# Patient Record
Sex: MALE
[De-identification: names, ages, dates, MRNs, and addresses within clinical notes are randomized per-mention and may not be internally consistent; named-entity substitution may affect disease eponyms.]

---

## 2018-04-22 ENCOUNTER — HOSPITAL ENCOUNTER (INPATIENT)
Dept: HOSPITAL 31 - C.ER | Age: 53
LOS: 5 days | Discharge: HOME | DRG: 430 | End: 2018-04-27
Attending: PSYCHIATRY & NEUROLOGY | Admitting: PSYCHIATRY & NEUROLOGY
Payer: MEDICAID

## 2018-04-22 VITALS — OXYGEN SATURATION: 97 %

## 2018-04-22 DIAGNOSIS — E78.00: ICD-10-CM

## 2018-04-22 DIAGNOSIS — Z59.0: ICD-10-CM

## 2018-04-22 DIAGNOSIS — F41.9: ICD-10-CM

## 2018-04-22 DIAGNOSIS — R45.851: ICD-10-CM
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
## 2018-04-22 DIAGNOSIS — I10: ICD-10-CM

## 2018-04-22 DIAGNOSIS — G47.00: ICD-10-CM

## 2018-04-22 DIAGNOSIS — F33.2: Primary | ICD-10-CM

## 2018-04-22 DIAGNOSIS — F10.10: ICD-10-CM

## 2018-04-22 DIAGNOSIS — F34.1: ICD-10-CM

## 2018-04-22 LAB
ALBUMIN SERPL-MCNC: 3.7 [, G/DL] (ref 3.5–5)
ALBUMIN/GLOB SERPL: 1.1 [,] (ref 1–2.1)
ALT SERPL-CCNC: 45 [, U/L] (ref 21–72)
AST SERPL-CCNC: 42 [, U/L] (ref 17–59)
BUN SERPL-MCNC: 15 [, MG/DL] (ref 9–20)
CALCIUM SERPL-MCNC: 9.2 [, MG/DL] (ref 8.6–10.4)
GFR NON-AFRICAN AMERICAN: > 60 [,]

## 2018-04-22 PROCEDURE — GZHZZZZ GROUP PSYCHOTHERAPY: ICD-10-PCS | Performed by: PSYCHIATRY & NEUROLOGY

## 2018-04-22 RX ADMIN — Medication SCH TAB: at 09:35

## 2018-04-22 SDOH — ECONOMIC STABILITY - HOUSING INSECURITY: HOMELESSNESS: Z59.0

## 2018-04-22 NOTE — PCM.PSYCH
Initial Psychiatric Evaluation





- Initial Psychiatric Evaluation


Type of Admission: Voluntary


Legal Status: Capacity


Chief Complaint (in patient's own words): 





"Depressed"


History of Present Illness and Precipitating Events: 





The patient is seen, chart reviewed and case discussed.


This is a 52-year-old white male, single with one son who is 20 years old. The 

patient lives alone and he is homeless. He left [facility] in [place] 

less than a week ago because he didn't like to program's focus on Religious. 

He says he is Latter-day but he didn't want to be in a program that "pushes it."


He reports depressive symptoms, had some suicidal ideation but no plan, anxiety 

especially panic attacks, but no paranoia or hallucinations elicited.


He has a long history of alcohol use and since he was discharged from [facility] 

[facility] he drank on and off. He denies drug use. He denies w sxs.





Past psych history: 4 or 5 admissions to psychiatry with depression and 

suicidal ideation, had one attempt in 2017 by OD and





Family psych history: Denies





Medical history: High cholesterol, hypertension, peripheral neuropathy


Current Medications: 





Active Medications











Generic Name Dose Route Start Last Admin





  Trade Name Freq  PRN Reason Stop Dose Admin


 


Amlodipine Besylate  5 mg  04/22/18 10:00  04/22/18 09:36





  Norvasc  PO   5 mg





  DAILY ADALID   Administration


 


Escitalopram Oxalate  10 mg  04/22/18 10:00  





  Lexapro  PO   





  DAILY ADALID   


 


Folic Acid  1 mg  04/22/18 10:00  04/22/18 09:35





  Folic Acid  PO   1 mg





  DAILY ADALID   Administration


 


Gabapentin  300 mg  04/22/18 10:00  





  Neurontin  PO   





  TID ADALID   


 


Hydroxyzine HCl  50 mg  04/22/18 08:52  





  Atarax  PO   





  Q6H PRN   





  Anxiety   


 


Ibuprofen  600 mg  04/22/18 08:52  





  Motrin Tab  PO   





  Q6H PRN   





  Pain, moderate (4-7)   


 


Losartan Potassium  100 mg  04/22/18 10:00  





  Cozaar  PO   





  DAILY [facility]   


 


Multivitamins  1 tab  04/22/18 10:00  04/22/18 09:35





  Hexavitamin  PO   1 tab





  DAILY ADALID   Administration


 


Quetiapine Fumarate  200 mg  04/22/18 22:00  





  Seroquel  PO   





  HS ADALID   


 


Rosuvastatin Calcium  10 mg  04/22/18 22:00  





  Crestor  PO   





  HS ADALID   


 


Thiamine HCl  100 mg  04/22/18 10:00  04/22/18 09:35





  Vitamin B1 Tab  PO   100 mg





  DAILY ADALID   Administration














Past Psychiatric History





- Past Psychiatric History


Previous Treatment History: Inpatient


Pertinent Medical Hx (Current Medical&Sleep Prob, Allergies): 





 Allergies











Allergy/AdvReac Type Severity Reaction Status Date / Time


 


No Known Allergies Allergy   Verified 04/22/18 02:40








 





Atorvastatin [Lipitor] 20 mg PO DAILY 04/22/18 


Escitalopram [Lexapro] 10 mg PO DAILY 04/22/18 


Gabapentin [Neurontin] 400 mg PO TID 04/22/18 


Losartan [Cozaar] 100 mg PO DAILY 04/22/18 


Mirtazapine [Remeron] 15 mg PO DAILY 04/22/18 


QUEtiapine [SEROquel] 200 mg PO DAILY 04/22/18 


amLODIPine [Norvasc] 5 mg PO DAILY 04/22/18 











Review of Systems





- Psychiatric


Psychiatric: Abnormal Sleep Pattern, Anhedonia, Anxiety, Change in Appetite, 

Depression, Difficulty Concentrating, Irritability.  absent: Hallucinations, 

Homicidal Ideation, Paranoia, Suicidal Ideation





Mental Status Examination





- Personal Presentation


Personal Presentation: Looks older than stated age





- Affect


Affect: Constricted





- Motor Activity


Motor Activity: Calm





- Reliability in Providing Information


Reliability in Providing Information: Good





- Speech


Speech: Organized





- Mood


Mood: Depressed, Anxious





- Formal Thought Process


Formal Thought Process: No Impairment





- Cognitive Functions


Orientation: Person, Place, Situation, Time


Sensorium: Alert


Attention/Concentration: Attentive


Estimate of Intelligence: Average


Judgement: Intact, as evidence by: Insight regarding need for hospitalization


Memory: Recent intact, as evidence by: Ability to recall events of the day, 

Remote intact, as evidenced by: Abilit to recall sig. life events





- Risk


Risk: Diminished functioning





- Strength & Assets Inventory


Strength & Assets Inventory: Cooperative





- Limitations


Limitations: Living alone, Other





DSM 5 DX





- DSM 5


DSM 5 Diagnosis: 





Major depression - recurrent, severe, w/o psychosis


Dysthymic d/o


r/o SAMPSON


Alcohol use d/o - severe





- Recommended/Plan of Treatment


Treatment Recommendations and Plan of Treatment: 





Lexapro and Seroquel for depression, insomnia, mood swings, irritability


Gabapentin for anxiety


Prn medications


All risks, benefits and alternatives of medications, including no medications, 

discussed and the patient understood and agreed.


Attend groups and activities


Individual therapy


Psychoeducation and support


Encourage compliance with meds and after care


Refer to outpatient program 


Teach healthy lifestyle methods, i.e. diet, exercise, meditation


Smoking cessation





32 min


Projected ELOS: 5-6 days


Prognosis: good w treatment


Discharge Plan and Discharge Criteria: 





No severe sx





- Smoking Cessation


Smoking Cessation Initiated: Yes

## 2018-04-22 NOTE — C.PDOC
History Of Present Illness


52 year old male presents to the ED via EMS for admittance to psychiatric floor 

here at the ED. Patient with a past medical history of bipolar disorder was 

referred from [facility] for admittance to facility by Dr. Donovan for 

medical workup. Patient is presented to us prior to heading upstairs. 


Chief Complaint (Nursing): Psychiatric Evaluation


History Per: Patient


History/Exam Limitations: no limitations


Onset/Duration Of Symptoms: Hrs


Current Symptoms Are (Timing): Still Present


Suicide/Self Injury Attempted (Context): None





Past Medical History


Reviewed: Historical Data, Nursing Documentation, Vital Signs


Vital Signs: 


 Last Vital Signs











Temp  97.8 F   04/22/18 02:31


 


Pulse  60   04/22/18 02:31


 


Resp  19   04/22/18 02:31


 


BP  131/81   04/22/18 02:31


 


Pulse Ox  96   04/22/18 03:06














- Medical History


PMH: Anxiety, Bipolar Disorder, Depression, HTN, Hypercholesterolemia, Chronic 

Kidney Disease


Surgical History: No Surg Hx


Family History: States: Unknown Family Hx





- Social History


Hx Alcohol Use: Yes ([place] [place] DAILY)


Hx Substance Use: No





- Immunization History


Hx Influenza Vaccination: Yes





Review Of Systems


Except As Marked, All Systems Reviewed And Found Negative.


Neurological: Positive for: Other (bipolar disorder)





Physical Exam





- Physical Exam


Appears: Non-toxic, No Acute Distress


Neurological/Psych: Oriented x3


Gait: Steady


Other Neurological Findings: No Other (no suicidal or homicidal ideation at 

this time. )





ED Course And Treatment


O2 Sat by Pulse Oximetry: 96 (RA)


Pulse Ox Interpretation: Normal





Medical Decision Making


Medical Decision Making: 


Time:0305


-- Physical exam unremarkable to stigmata of drug use. Lab work review patients 

and they are clear to psych floor for bipolar disorder. 





Disposition





- Disposition


Forms:  [facility] (English)





- Scribe Statement


The provider has reviewed the documentation as recorded by the Desi Lopez 





Provider Attestation: 








All medical record entries made by the Scribe were at my direction and 

personally dictated by me. I have reviewed the chart and agree that the record 

accurately reflects my personal performance of the history, physical exam, 

medical decision making, and the department course for this patient. I have 

also personally directed, reviewed, and agree with the discharge instructions 

and disposition.

## 2018-04-22 NOTE — PCM.BM
<Amarjit Hernandez - Last Filed: 04/22/18 05:04>





Treatment Plan Problems





- Problems identified on initial assessmt


  ** Depression


Date Initiated: 04/22/18


Time Initiated: 04:30


Assessment reference: NA


Status: Active





  ** Alcohol Abuse


Date Initiated: 04/22/18


Time Initiated: 04:30


Assessment reference: NA


Status: Active





Treatment assets and liabiliti


Patient Assests: ADL independent


Patient Liabilities: poor support system, relationship conflicts, substance 

abuse (ETOH)





- Milieu Protocol


Maintain good personal hygiene: daily Encourage regular showers, daily Remind 

patient to perform daily oral care, every shift Assist patient to perform ADL's


Conduct patient checks and document Observation sheet: Q15 minutes


Maintain personal safety: every shift Educate patient to report safety concerns 

to staff, every shift Monitor environment for contraband/sharps


Medication safety: Monitor for expected outcome, potential side effects: every 

shift, Assess barriers to learning: every shift, Assess readiness for 

medication education: every shift





<Mason Donovan - Last Filed: 04/22/18 16:50>





- Diagnosis


(1) Major depressive disorder, recurrent episode, severe


Status: Acute   


Interventions: 





04/22/18 16:50


* Assess/adjust medications daily and /or as needed


* See patient on an individual basis 7x/week to assess symptoms of depression


* Monitor for side effects & effectiveness of medications


* 








(2) Alcohol use disorder, severe, dependence


Status: Acute   


Interventions: 





04/22/18 16:50


* Assess 7x/week regarding severity of withdrawal


* Educate regarding risks, benefits, side effects and alternatives of 

medications


* Use Motivational Interviewing for abstinence


* Use CBT for relapse prevention


* Medication management for withdrawal symptoms


* Encourage medication assisted treatment


* 








<Liya Dubois - Last Filed: 04/23/18 11:39>





Family Contact


Family involvement: Famliy/SO not involved





- Goals for Treatment


Patient goals for treatment: "I want to go to rehab."





Discharge/Continuing Care





- Education Needs


Education Needs: Patient Medication, Patient Coping Skills, Patient Placement 

options, Patient Community resources





- Discharge


Discharge Criteria: Tolerates medication w/o severe side effects, No longer 

exhibiting s/s of withdrawal, Reduction of target symptoms


Discharge to:: Substance Abuse Rehab





- Treatment Team Participation


Discussed with Family/SO: No


Was Patient/Family/SO present at Treatment Team Meeting: Yes

## 2018-04-23 RX ADMIN — Medication SCH TAB: at 09:41

## 2018-04-23 NOTE — PCM.PYCHPN
Psychiatric Progress Note





- Psychiatric Progress Note


Patient seen today, length of contact: 17 min


Patient Chief Complaint: 





"Still depressed"


Problems Identified/Issues Discussed: 





The pt is seen, chart reviewed, case discussed with staff.


Support given, CBT and MI used briefly


No new symptoms reported, improving slowly and needs more time


No SEs from medications, risks discussed.


After care discussed


Medication Change: Yes


Medical Record Reviewed: Yes





Mental Status Examination





- Cognitive Function


Orientation: Person, Place, Situation, Time


Memory: Intact


Attention: Poor


Concentration: Poor


Association: WNL


Fund of Knowledge: WNL





- Mood


Mood: Depressed, Anxious





- Affect


Affect: Constricted





- Speech


Speech: Appropriate





- Formal Thought Process


Formal Thought Process: No Impairment





- Suicidal Ideation


Suicidal Ideation: No





- Homicidal Ideation


Homicidal Ideation: No





Goal/Treatment Plan





- Goal/Treatment Plan


Need for Continued Stay: Discharge may exacerbated symptoms, Severe functional 

impairment


Progress Toward Problem(s) and Goals/Treatment Plan: 





Lexapro and Seroquel for depression, insomnia, mood swings, irritability


Gabapentin for anxiety


Prn medications


All risks, benefits and alternatives of medications, including no medications, 

discussed and the patient understood and agreed.


Attend groups and activities


Individual therapy


Psychoeducation and support


Encourage compliance with meds and after care


Refer to outpatient program 


Teach healthy lifestyle methods, i.e. diet, exercise, meditation


Smoking cessation

## 2018-04-24 VITALS — RESPIRATION RATE: 20 BRPM

## 2018-04-24 RX ADMIN — Medication SCH TAB: at 10:34

## 2018-04-24 NOTE — PCM.PYCHPN
Psychiatric Progress Note





- Psychiatric Progress Note


Patient seen today, length of contact: 16 min


Patient Chief Complaint: 





"not well"


Problems Identified/Issues Discussed: 





The pt is seen, chart reviewed, case discussed with staff.


The pt is compliant with medications and reports no side-effects.


Symptoms are improving but needs more time to stabilize. 


After care discussed, support and psychoeducation given.


Wants rehab but not [facility]


Medication Change: No


Medical Record Reviewed: Yes





Mental Status Examination





- Cognitive Function


Orientation: Person, Place, Situation, Time


Memory: Intact


Attention: Poor


Concentration: Poor


Association: WNL


Fund of Knowledge: WNL





- Mood


Mood: Depressed, Anxious





- Affect


Affect: Constricted





- Speech


Speech: Appropriate





- Formal Thought Process


Formal Thought Process: No Impairment





- Suicidal Ideation


Suicidal Ideation: No





- Homicidal Ideation


Homicidal Ideation: No





Goal/Treatment Plan





- Goal/Treatment Plan


Need for Continued Stay: Discharge may exacerbated symptoms, Severe functional 

impairment


Progress Toward Problem(s) and Goals/Treatment Plan: 





Lexapro and Seroquel for depression, insomnia, mood swings, irritability


Gabapentin for anxiety


Prn medications


All risks, benefits and alternatives of medications, including no medications, 

discussed and the patient understood and agreed.


Attend groups and activities


Individual therapy


Psychoeducation and support


Encourage compliance with meds and after care


Refer to outpatient program 


Teach healthy lifestyle methods, i.e. diet, exercise, meditation


Smoking cessation

## 2018-04-25 RX ADMIN — Medication SCH TAB: at 09:52

## 2018-04-25 NOTE — PCM.PYCHPN
Psychiatric Progress Note





- Psychiatric Progress Note


Patient seen today, length of contact: 18 min


Patient Chief Complaint: 





"I am feeling a little better, but am still depressed"


Problems Identified/Issues Discussed: 


The pt is seen, chart reviewed, case discussed with staff.


Support given, CBT and MI used briefly


No new symptoms reported, improving slowly and needs more time


No SEs from medications, risks discussed.


After care discussed - patient wants rehab, but not [facility], and is 

applying to programs


Medication Change: No


Medical Record Reviewed: Yes





Mental Status Examination





- Cognitive Function


Orientation: Person, Place, Situation, Time


Memory: Intact


Attention: Poor


Concentration: Poor


Association: WNL


Fund of Knowledge: WNL





- Mood


Mood: Depressed, Anxious





- Affect


Affect: Constricted





- Speech


Speech: Appropriate





- Formal Thought Process


Formal Thought Process: No Impairment





- Suicidal Ideation


Suicidal Ideation: No





- Homicidal Ideation


Homicidal Ideation: No





Goal/Treatment Plan





- Goal/Treatment Plan


Need for Continued Stay: Discharge may exacerbated symptoms, Severe functional 

impairment


Progress Toward Problem(s) and Goals/Treatment Plan: 





Lexapro and Seroquel for depression, insomnia, mood swings, irritability


Gabapentin for anxiety


Prn medications


All risks, benefits and alternatives of medications, including no medications, 

discussed and the patient understood and agreed.


Attend groups and activities


Individual therapy


Psychoeducation and support


Encourage compliance with meds and after care


Refer to outpatient program - patient is applying to programs


Teach healthy lifestyle methods, i.e. diet, exercise, meditation


Smoking cessation

## 2018-04-26 RX ADMIN — Medication SCH TAB: at 09:03

## 2018-04-26 NOTE — PCM.PYCHPN
Psychiatric Progress Note





- Psychiatric Progress Note


Patient seen today, length of contact: 16 min


Patient Chief Complaint: 





"My mood isn't good"


Problems Identified/Issues Discussed: 


The pt is seen, chart reviewed, case discussed with staff.


Support given, CBT and MI used briefly


No new symptoms reported, improving slowly and needs more time


No SEs from medications, risks discussed.


After care discussed - patient wants rehab, but not [facility], and is 

interested in [facility]


Patient states that he still feels depressed and is not in a good mood; 

complained that it was chaotic this morning


Discussed with patient that his mood will improve if he has something positive 

to look forward to


Medication Change: No


Medical Record Reviewed: Yes





Mental Status Examination





- Cognitive Function


Orientation: Person, Place, Situation, Time


Memory: Intact


Attention: Poor


Concentration: Poor


Association: WNL


Fund of Knowledge: WNL





- Mood


Mood: Depressed, Anxious





- Affect


Affect: Constricted





- Speech


Speech: Appropriate





- Formal Thought Process


Formal Thought Process: No Impairment





- Suicidal Ideation


Suicidal Ideation: No





- Homicidal Ideation


Homicidal Ideation: No





Goal/Treatment Plan





- Goal/Treatment Plan


Need for Continued Stay: Discharge may exacerbated symptoms, Severe functional 

impairment


Progress Toward Problem(s) and Goals/Treatment Plan: 





Lexapro and Seroquel for depression, insomnia, mood swings, irritability


Gabapentin for anxiety


Prn medications


All risks, benefits and alternatives of medications, including no medications, 

discussed and the patient understood and agreed.


Attend groups and activities


Individual therapy


Psychoeducation and support


Encourage compliance with meds and after care


Refer to outpatient program - patient is interested in applying to [facility]


Teach healthy lifestyle methods, i.e. diet, exercise, meditation


Smoking cessation

## 2018-04-27 VITALS — DIASTOLIC BLOOD PRESSURE: 63 MMHG | HEART RATE: 54 BPM | SYSTOLIC BLOOD PRESSURE: 100 MMHG | TEMPERATURE: 98.1 F

## 2018-04-27 RX ADMIN — Medication SCH TAB: at 09:56

## 2018-04-27 NOTE — PCM.PYCHDC
Mental Status Examination





- Mental Status Examination


Orientation: Person, Place, Situation, Time


Memory: Intact


Mood: Depressed, Anxious


Affect: Broad


Speech: Appropriate


Attention: WNL


Concentration: WNL


Association: WNL


Fund of Knowledge: WNL


Formal Thought Process: No Impairment


Current Homicidal Ideation?: No





Discharge Summary





- Discharge Note


Reason for Hospitalization: 


Depression





Consultations:: List each consultation separately and include:  1. Reason for 

request.  2. Findings.  3. Follow-up


Summary of Hospital Course include:: 1. Description of specific treatment plan 

utilized for patients during their course of treatmen.  2. Summarize the time-

course for resolution of acute symptoms and/or regressed behaviors.  3. 

Describe issues identified and worked on during hospitalization.  4. Describe 

medication utilized.  5. Describe medical problems identified and treated.  6. 

Reassessment of suicide risk


Summary of Hospital Course: 





On Admission:


This is a 52-year-old white male, single with one son who is 20 years old. The 

patient lives alone and he is homeless. He left [facility] in [place] 

less than a week ago because he didn't like to program's focus on Baptism. 

He says he is Faith but he didn't want to be in a program that "pushes it."


He reports depressive symptoms, had some suicidal ideation but no plan, anxiety 

especially panic attacks, but no paranoia or hallucinations elicited.


He has a long history of alcohol use and since he was discharged from [facility] he drank on and off. He denies drug use. He denies wdw sxs.





Past psych history: 4 or 5 admissions to psychiatry with depression and 

suicidal ideation, had one attempt in 2017 by OD and





Family psych history: Denies





Medical history: High cholesterol, hypertension, peripheral neuropathy





Hospital course:


The pt was admitted and started on treatment with psychotherapy, support, 

psychoeducation and medications. 


MI and CBT used.


The pt attended groups and activities, as well as milieu therapy.


All the risks and benefits of medications are discussed and the patient 

understood and agreed.


The pt improved with the treatments provided. 


After care discussed with the patient.


Patient is going to [facility]








- Final Diagnosis (DSM 5)


Condition upon Discharge: IMPROVED


DSM 5: 


Major depression - recurrent, severe, w/o psychosis


Dysthymic d/o


r/o SAMPSON


Alcohol use d/o - severe





Disposition: HOME/ ROUTINE


Follow-up Treatment Plan: 





Continue below medications after discharge.


Follow after care plan as discussed.


Use relapse prevention skills


Return to ER or call 911 if suicidal, homicidal or symptoms relapse.


Stay away from stress, alcohol and drugs.


See primary doctor regularly and get labs. 


Patient is going to [facility]





Prescriptions/Medication Reconciliation: 


amLODIPine [Norvasc] 5 mg PO DAILY #30 tab


Escitalopram [Lexapro] 20 mg PO DAILY #30 tab


Gabapentin [Neurontin] 300 mg PO TID #90 cap


Losartan [Cozaar] 100 mg PO DAILY #30 tab


Mirtazapine [Remeron] 15 mg PO HS #30 tab


Naltrexone [Revia] 50 mg PO DAILY #30 tab


QUEtiapine [SEROquel] 200 mg PO HS #30 tab


Rosuvastatin Calcium [Crestor] 10 mg PO HS #30 tab





- Smoking Cessation


Smoking Cessation Medication prescribed: No